# Patient Record
Sex: FEMALE | Race: WHITE | NOT HISPANIC OR LATINO | URBAN - METROPOLITAN AREA
[De-identification: names, ages, dates, MRNs, and addresses within clinical notes are randomized per-mention and may not be internally consistent; named-entity substitution may affect disease eponyms.]

---

## 2023-06-01 ENCOUNTER — OFFICE (OUTPATIENT)
Dept: URBAN - METROPOLITAN AREA CLINIC 34 | Facility: CLINIC | Age: 63
End: 2023-06-01
Payer: OTHER GOVERNMENT

## 2023-06-01 VITALS
DIASTOLIC BLOOD PRESSURE: 74 MMHG | HEIGHT: 69 IN | HEART RATE: 78 BPM | SYSTOLIC BLOOD PRESSURE: 125 MMHG | WEIGHT: 216 LBS | TEMPERATURE: 98.5 F

## 2023-06-01 DIAGNOSIS — R11.10 VOMITING, UNSPECIFIED: ICD-10-CM

## 2023-06-01 DIAGNOSIS — K21.9 GASTRO-ESOPHAGEAL REFLUX DISEASE WITHOUT ESOPHAGITIS: ICD-10-CM

## 2023-06-01 DIAGNOSIS — R13.19 OTHER DYSPHAGIA: ICD-10-CM

## 2023-06-01 DIAGNOSIS — Z79.1 LONG TERM (CURRENT) USE OF NON-STEROIDAL ANTI-INFLAMMATORIES: ICD-10-CM

## 2023-06-01 DIAGNOSIS — Z12.11 ENCOUNTER FOR SCREENING FOR MALIGNANT NEOPLASM OF COLON: ICD-10-CM

## 2023-06-01 DIAGNOSIS — K59.09 OTHER CONSTIPATION: ICD-10-CM

## 2023-06-01 PROCEDURE — 99204 OFFICE O/P NEW MOD 45 MIN: CPT | Performed by: PHYSICIAN ASSISTANT

## 2023-06-01 RX ORDER — FAMOTIDINE 40 MG/1
TABLET, FILM COATED ORAL
Qty: 60 | Refills: 3 | Status: ACTIVE
Start: 2023-06-01

## 2023-06-01 NOTE — SERVICENOTES
Discussed risk/benefit/options for CRC screening including colonoscopy vs Cologuard screening. She prefers to do Cologuard for now, understanding of limitations of this test. 

I have reviewed the history, physical exam, assessment and management plans.  I concur with or have edited all elements of her note.

## 2023-06-01 NOTE — SERVICEHPINOTES
Ms. Umanzor is a 62 YoF  with hx of GERD, arthritis, HTN referred to the office for colorectal cancer screening. She had prior screening colonoscopy in 2011 - reports no polyps. No FHx CRC. She has pending joint procedures/surgery and prefers not to have colonoscopy at this time, inquires about Cologuard. She reports bowel habits are regular/daily, soft/formed. She takes Miralax most days to keep regular (prior constipation). No abd pain, hard stools/straining, melena, or rectal bleeding.
br br She has secondary concern about longstanding/frequent acid/reflux. She has associated dysphagia with solids, food getting "stuck" in her chest. If she lays down after a large meal she may experience regurgitation. She reports remote barium swallow demonstrated dysmotility. Prior EGD with "stretch." She had CCY in 2009, felt reflux worsened after. Previously took Nexium but does not want to continue taking PPIs. She takes TUMs as needed, also baking occasionally soda.brNo diagnosed cardiac/pulmonary conditions. HTN controlled with medication. No AC or AntiPlt medications. She takes daily Aleve for arthritic pain.